# Patient Record
Sex: MALE | Race: WHITE | NOT HISPANIC OR LATINO | URBAN - METROPOLITAN AREA
[De-identification: names, ages, dates, MRNs, and addresses within clinical notes are randomized per-mention and may not be internally consistent; named-entity substitution may affect disease eponyms.]

---

## 2020-06-09 ENCOUNTER — TELEPHONE ENCOUNTER (OUTPATIENT)
Dept: URBAN - METROPOLITAN AREA CLINIC 98 | Facility: CLINIC | Age: 75
End: 2020-06-09

## 2020-06-10 ENCOUNTER — TELEPHONE ENCOUNTER (OUTPATIENT)
Dept: URBAN - METROPOLITAN AREA CLINIC 92 | Facility: CLINIC | Age: 75
End: 2020-06-10

## 2020-06-17 ENCOUNTER — TELEPHONE ENCOUNTER (OUTPATIENT)
Dept: URBAN - METROPOLITAN AREA CLINIC 98 | Facility: CLINIC | Age: 75
End: 2020-06-17

## 2020-06-26 ENCOUNTER — OFFICE VISIT (OUTPATIENT)
Dept: URBAN - METROPOLITAN AREA SURGERY CENTER 18 | Facility: SURGERY CENTER | Age: 75
End: 2020-06-26

## 2020-06-30 ENCOUNTER — OFFICE VISIT (OUTPATIENT)
Dept: URBAN - METROPOLITAN AREA CLINIC 98 | Facility: CLINIC | Age: 75
End: 2020-06-30

## 2020-07-23 ENCOUNTER — TELEPHONE ENCOUNTER (OUTPATIENT)
Dept: URBAN - METROPOLITAN AREA CLINIC 98 | Facility: CLINIC | Age: 75
End: 2020-07-23

## 2020-07-24 ENCOUNTER — OFFICE VISIT (OUTPATIENT)
Dept: URBAN - METROPOLITAN AREA SURGERY CENTER 18 | Facility: SURGERY CENTER | Age: 75
End: 2020-07-24

## 2020-09-04 ENCOUNTER — TELEPHONE ENCOUNTER (OUTPATIENT)
Dept: URBAN - METROPOLITAN AREA CLINIC 92 | Facility: CLINIC | Age: 75
End: 2020-09-04

## 2020-10-13 ENCOUNTER — OFFICE VISIT (OUTPATIENT)
Dept: URBAN - METROPOLITAN AREA CLINIC 98 | Facility: CLINIC | Age: 75
End: 2020-10-13

## 2020-10-13 NOTE — HPI-TODAY'S VISIT:
Small bowel resection over 20 years ago.  Frequent loose stools.  Epigastric pain.  Seen in emergency room February 2020.  Return to emergency room March 2020.  Intermittent epigastric pain.  Loose stool.  Has abdominal hernia from prior surgery.  Atrial fibrillation.  Chronic edema of the lower extremity with stasis changes.  On Coumadin.  Followed at [facility] by cardiology.  CT scan of abdomen was done 3/26/2020.  Benign liver cyst.  Status post cholecystectomy.  Periumbilical hernia.  Bilateral renal calcifications.  Possible colitis.  Possible cystitis.

## 2021-10-18 ENCOUNTER — TELEPHONE ENCOUNTER (OUTPATIENT)
Dept: URBAN - METROPOLITAN AREA CLINIC 92 | Facility: CLINIC | Age: 76
End: 2021-10-18

## 2021-10-21 LAB
ACCA: 77
ALCA: 106
AMCA: 62
ATYPICAL PANCA: NEGATIVE
C-REACTIVE PROTEIN, QUANT: <1
COMMENTS: (no result)
GASCA: 86
IRON BIND.CAP.(TIBC): 295
IRON SATURATION: 18
IRON: 54
UIBC: 241
VITAMIN B12: 231

## 2021-10-23 ENCOUNTER — TELEPHONE ENCOUNTER (OUTPATIENT)
Dept: URBAN - METROPOLITAN AREA CLINIC 92 | Facility: CLINIC | Age: 76
End: 2021-10-23

## 2021-11-10 ENCOUNTER — OFFICE VISIT (OUTPATIENT)
Dept: URBAN - METROPOLITAN AREA CLINIC 97 | Facility: CLINIC | Age: 76
End: 2021-11-10
Payer: MEDICARE

## 2021-11-10 ENCOUNTER — OFFICE VISIT (OUTPATIENT)
Dept: URBAN - METROPOLITAN AREA CLINIC 98 | Facility: CLINIC | Age: 76
End: 2021-11-10
Payer: MEDICARE

## 2021-11-10 ENCOUNTER — WEB ENCOUNTER (OUTPATIENT)
Dept: URBAN - METROPOLITAN AREA CLINIC 98 | Facility: CLINIC | Age: 76
End: 2021-11-10

## 2021-11-10 DIAGNOSIS — E53.8 VITAMIN B 12 DEFICIENCY: ICD-10-CM

## 2021-11-10 DIAGNOSIS — E53.8 VITAMIN B12 DEFICIENCY: ICD-10-CM

## 2021-11-10 DIAGNOSIS — K52.9 CHRONIC DIARRHEA: ICD-10-CM

## 2021-11-10 PROCEDURE — 99214 OFFICE O/P EST MOD 30 MIN: CPT | Performed by: INTERNAL MEDICINE

## 2021-11-10 PROCEDURE — 96372 THER/PROPH/DIAG INJ SC/IM: CPT | Performed by: INTERNAL MEDICINE

## 2021-11-10 RX ORDER — CHOLESTYRAMINE 4 G/5G
1 SCOOP POWDER, FOR SUSPENSION ORAL ONCE A DAY
Qty: 30 | Refills: 3 | OUTPATIENT
Start: 2021-11-21

## 2021-11-10 NOTE — HPI-TODAY'S VISIT:
77 yo pt w/ intermittent diarrhea here for follow up. Has been having intermittent loose to semi-formed bm's 3 to 4  x d, w/o nocturnal diarrhea. No abdominal pain. No fever or chills. No melenic stools nor hematochezia. Colonoscopy 6/20: w/ colon polyps. He was admitted recently to [facility]: A + P CT scan showed ileitis cw CD. Labs w/ Vitamin B 12 deficiency. To start B12 replacement. [facility]. Today, he feels well, x for persistent loose stools w/o bleeding. No anorexia or weight loss. No constitutional sxs.  EGD 6/20: NO-Schatzki's ring and mild gastritis.  He had segmental SB resection 30 yrs ago, and since he has been having loose stools. He's s/p bladder surgery on 9/21.  No COVID-19 exposure and has received 2 doses of COVID-19 vaccine.

## 2021-11-17 ENCOUNTER — OFFICE VISIT (OUTPATIENT)
Dept: URBAN - METROPOLITAN AREA CLINIC 97 | Facility: CLINIC | Age: 76
End: 2021-11-17
Payer: MEDICARE

## 2021-11-17 DIAGNOSIS — E53.8 B12 DEFICIENCY: ICD-10-CM

## 2021-11-17 PROCEDURE — 96372 THER/PROPH/DIAG INJ SC/IM: CPT | Performed by: INTERNAL MEDICINE

## 2021-11-24 ENCOUNTER — CLAIMS CREATED FROM THE CLAIM WINDOW (OUTPATIENT)
Dept: URBAN - METROPOLITAN AREA CLINIC 97 | Facility: CLINIC | Age: 76
End: 2021-11-24
Payer: MEDICARE

## 2021-11-24 ENCOUNTER — OFFICE VISIT (OUTPATIENT)
Dept: URBAN - METROPOLITAN AREA CLINIC 97 | Facility: CLINIC | Age: 76
End: 2021-11-24

## 2021-11-24 ENCOUNTER — CLAIMS CREATED FROM THE CLAIM WINDOW (OUTPATIENT)
Dept: URBAN - METROPOLITAN AREA CLINIC 97 | Facility: CLINIC | Age: 76
End: 2021-11-24

## 2021-11-24 DIAGNOSIS — D51.1 VITAMIN B12 DEFICIENCY ANEMIA DUE TO MALABSORPTION WITH PROTEINURIA: ICD-10-CM

## 2021-11-24 DIAGNOSIS — E53.8 VITAMIN B12 DEFICIENCY: ICD-10-CM

## 2021-11-24 DIAGNOSIS — D51.9 VITAMIN B12 DEFICIENCY ANEMIA, UNSPECIFIED: ICD-10-CM

## 2021-11-24 DIAGNOSIS — D51.0 VITAMIN B12 DEFICIENCY ANEMIA DUE TO INTRINSIC FACTOR DEFICIENCY: ICD-10-CM

## 2021-11-24 PROCEDURE — 96372 THER/PROPH/DIAG INJ SC/IM: CPT | Performed by: INTERNAL MEDICINE

## 2021-12-01 ENCOUNTER — OFFICE VISIT (OUTPATIENT)
Dept: URBAN - METROPOLITAN AREA CLINIC 97 | Facility: CLINIC | Age: 76
End: 2021-12-01
Payer: MEDICARE

## 2021-12-01 DIAGNOSIS — E53.8 VITAMIN B12 DEFICIENCY: ICD-10-CM

## 2021-12-01 PROBLEM — 191142007 VITAMIN B12 DEFICIENCY ANEMIA DUE TO MALABSORPTION WITH PROTEINURIA: Status: ACTIVE | Noted: 2021-11-10

## 2021-12-01 PROBLEM — 84027009 PERNICIOUS ANEMIA: Status: ACTIVE | Noted: 2021-11-10

## 2021-12-01 PROCEDURE — 96372 THER/PROPH/DIAG INJ SC/IM: CPT | Performed by: INTERNAL MEDICINE

## 2022-10-03 ENCOUNTER — TELEPHONE ENCOUNTER (OUTPATIENT)
Dept: URBAN - METROPOLITAN AREA CLINIC 98 | Facility: CLINIC | Age: 77
End: 2022-10-03

## 2022-10-12 ENCOUNTER — OFFICE VISIT (OUTPATIENT)
Dept: URBAN - METROPOLITAN AREA CLINIC 98 | Facility: CLINIC | Age: 77
End: 2022-10-12
Payer: MEDICARE

## 2022-10-12 VITALS
DIASTOLIC BLOOD PRESSURE: 73 MMHG | HEART RATE: 60 BPM | TEMPERATURE: 98.4 F | BODY MASS INDEX: 35.82 KG/M2 | WEIGHT: 215 LBS | HEIGHT: 65 IN | SYSTOLIC BLOOD PRESSURE: 148 MMHG

## 2022-10-12 DIAGNOSIS — I25.10 CORONARY ARTERY DISEASE INVOLVING NATIVE CORONARY ARTERY OF NATIVE HEART WITHOUT ANGINA PECTORIS: ICD-10-CM

## 2022-10-12 DIAGNOSIS — E66.9 OBESITY (BMI 30.0-34.9): ICD-10-CM

## 2022-10-12 DIAGNOSIS — R19.8 CHANGE IN BOWEL MOVEMENT: ICD-10-CM

## 2022-10-12 DIAGNOSIS — E88.81 METABOLIC SYNDROME: ICD-10-CM

## 2022-10-12 DIAGNOSIS — K21.9 GERD WITHOUT ESOPHAGITIS: ICD-10-CM

## 2022-10-12 PROCEDURE — 99214 OFFICE O/P EST MOD 30 MIN: CPT | Performed by: INTERNAL MEDICINE

## 2022-10-12 NOTE — HPI-TODAY'S VISIT:
78 yo pt w/ intermittent diarrhea here for follow up. He's on a wheelchair and accompanied by his grandson. He reports intermittent p-prandial loose, non-bloody stools alternating w constipation and intermittent lower abdominal discomfort, pain that is relieved w bm's. On 6/21, had bladder surgery. Six mos later had an MI and admitted to [facility]; while in the Hospital he had an unprepped colonoscopy for anemia; after D/C from the Hospital he was told he has CD and asked to have GI follow up. He has no diarrhea or bleeding. Denies fever, chills and no anorexia or weight loss. No copy of colonoscopy report nor path is available as yet.  Colonoscopy 6/20: fair prep, rectal mucosal prolapse polyp and negative random colonic bx's. He has a previous admission to [facility]: A + P CT scan showed ileitis cw CD. Labs w/ Vitamin B 12 deficiency on B 12 supplements. . He admits beeing seen by [facility], who felt no evidence of CD. Today, he feels well, x for persistent loose stools w/o bleeding. No anorexia or weight loss. No constitutional sxs.  EGD 6/20: NO-Schatzki's ring, NERD,  mild Hp-negative chronic gastritis and normal duodenal bx's.  He had segmental SB resection 30 yrs ago for a benign SB lesion. He carries a Dx of ? UC in 2013 and was on Remicade / Mesalamine then for some time, and since he has been having loose stools. Unable to find colonoscopy report from then. He's s/p bladder surgery on 9/21 as mentioned.   No COVID-19 exposure and has received 2 doses of COVID-19 vaccine.

## 2022-10-18 PROBLEM — 443371000124107: Status: ACTIVE | Noted: 2022-10-18

## 2022-10-18 PROBLEM — 237602007: Status: ACTIVE | Noted: 2022-10-18

## 2022-10-18 PROBLEM — 266435005: Status: ACTIVE | Noted: 2022-10-18

## 2022-10-18 PROBLEM — 443502000: Status: ACTIVE | Noted: 2022-10-18

## 2022-10-27 ENCOUNTER — TELEPHONE ENCOUNTER (OUTPATIENT)
Dept: URBAN - METROPOLITAN AREA CLINIC 98 | Facility: CLINIC | Age: 77
End: 2022-10-27

## 2023-03-14 ENCOUNTER — TELEPHONE ENCOUNTER (OUTPATIENT)
Dept: URBAN - METROPOLITAN AREA CLINIC 98 | Facility: CLINIC | Age: 78
End: 2023-03-14

## 2023-04-07 ENCOUNTER — OFFICE VISIT (OUTPATIENT)
Dept: URBAN - METROPOLITAN AREA TELEHEALTH 2 | Facility: TELEHEALTH | Age: 78
End: 2023-04-07
Payer: MEDICARE

## 2023-04-07 ENCOUNTER — DASHBOARD ENCOUNTERS (OUTPATIENT)
Age: 78
End: 2023-04-07

## 2023-04-07 DIAGNOSIS — R19.8 CHANGE IN BOWEL MOVEMENT: ICD-10-CM

## 2023-04-07 DIAGNOSIS — K21.9 GERD WITHOUT ESOPHAGITIS: ICD-10-CM

## 2023-04-07 DIAGNOSIS — E88.81 METABOLIC SYNDROME: ICD-10-CM

## 2023-04-07 DIAGNOSIS — E66.9 OBESITY (BMI 30.0-34.9): ICD-10-CM

## 2023-04-07 PROCEDURE — 99214 OFFICE O/P EST MOD 30 MIN: CPT | Performed by: INTERNAL MEDICINE

## 2023-04-07 NOTE — HPI-TODAY'S VISIT:
This is a telehealth OV to which patient has agreed to. Limitations of telehealth discussed, pt understands and agrees to proceed. Patient's @ home during this virtual OV.  78 yo pt w/ intermittent diarrhea here for follow up. Today, he reports constipation w straining at stools: bm's 2 to 3 x/d w occasional blood in stools, seen in tolet paper, whenever he has a bm. He had  a segmental SB resection several yrs ago for a benign lesion. Since he has been having loose bm's few times a day wo bleeding nor abdominal pain. Currently on Miralax as needed.  He's wheelchair bound. He reports intermittent p-prandial loose, non-bloody stools alternating w constipation and intermittent lower abdominal discomfort, pain that is relieved w bm's. On 6/21, had bladder surgery. Six mos later had an MI and admitted to [facility]; while in the Hospital he had an unprepped colonoscopy for anemia; after D/C from the Hospital he was told he has CD and asked to have GI follow up. He has no diarrhea or bleeding. Denies fever, chills and no anorexia or weight loss. No copy of colonoscopy report nor path is available as yet.  Colonoscopy 6/20: fair prep, rectal mucosal prolapse polyp and negative random colonic bx's. He has a previous admission to [facility]: A + P CT scan showed ileitis cw CD. Labs w/ Vitamin B 12 deficiency on B 12 supplements. . [facility] felt he has no evidence of CD. Today, he feels well overall. No anorexia or weight loss. No constitutional sxs.  EGD 6/20: NO-Schatzki's ring, NERD,  mild Hp-negative chronic gastritis and normal duodenal bx's.  He had segmental SB resection 30 yrs ago for a benign SB lesion. He carries a Dx of ? UC in 2013 and was on Remicade / Mesalamine then for some time, and since he has been having loose stools. Unable to find colonoscopy report from then. He's s/p bladder surgery on 9/21 as mentioned.   No COVID-19 exposure and has received 2 doses of COVID-19 vaccine.

## 2025-08-13 ENCOUNTER — LAB OUTSIDE AN ENCOUNTER (OUTPATIENT)
Dept: URBAN - METROPOLITAN AREA CLINIC 98 | Facility: CLINIC | Age: 80
End: 2025-08-13

## 2025-08-13 ENCOUNTER — OFFICE VISIT (OUTPATIENT)
Dept: URBAN - METROPOLITAN AREA CLINIC 98 | Facility: CLINIC | Age: 80
End: 2025-08-13
Payer: MEDICARE

## 2025-08-13 DIAGNOSIS — K52.9 IBD (INFLAMMATORY BOWEL DISEASE): ICD-10-CM

## 2025-08-13 PROCEDURE — 99204 OFFICE O/P NEW MOD 45 MIN: CPT | Performed by: INTERNAL MEDICINE

## 2025-08-13 PROCEDURE — 99214 OFFICE O/P EST MOD 30 MIN: CPT | Performed by: INTERNAL MEDICINE

## 2025-08-13 RX ORDER — SULFASALAZINE 500 MG/1
1 TABLET TABLET ORAL TWICE A DAY
Qty: 60 TABLET | Refills: 3 | OUTPATIENT
Start: 2025-08-13 | End: 2025-12-11

## 2025-08-18 ENCOUNTER — LAB OUTSIDE AN ENCOUNTER (OUTPATIENT)
Dept: URBAN - METROPOLITAN AREA CLINIC 98 | Facility: CLINIC | Age: 80
End: 2025-08-18

## 2025-08-19 ENCOUNTER — TELEPHONE ENCOUNTER (OUTPATIENT)
Dept: URBAN - METROPOLITAN AREA CLINIC 98 | Facility: CLINIC | Age: 80
End: 2025-08-19

## 2025-08-25 LAB
ADENOVIRUS F 40/41: NOT DETECTED
ANTI-SACCHAROMYCES CEREVISIAE ANTIBODIES (ASCA) - QDX: POSITIVE
CALPROTECTIN, STOOL - QDX: (no result)
CAMPYLOBACTER: NOT DETECTED
CLOSTRIDIUM DIFFICILE: NOT DETECTED
ENTAMOEBA HISTOLYTICA: NOT DETECTED
ENTEROAGGREGATIVE E.COLI: NOT DETECTED
ENTEROTOXIGENIC E.COLI: NOT DETECTED
ESCHERICHIA COLI O157: NOT DETECTED
GIARDIA LAMBLIA: NOT DETECTED
NOROVIRUS GI/GII: NOT DETECTED
OVA AND PARASITE - QDX: (no result)
ROTAVIRUS A: NOT DETECTED
SALMONELLA SPP.: NOT DETECTED
SHIGA-LIKE TOXIN PRODUCING E.COLI: NOT DETECTED
SHIGELLA SPP. / ENTEROINVASIVE E.COLI: NOT DETECTED
VIBRIO PARAHAEMOLYTICUS: NOT DETECTED
VIBRIO SPP.: NOT DETECTED
YERSINIA ENTEROCOLITICA: NOT DETECTED

## 2025-08-28 ENCOUNTER — OFFICE VISIT (OUTPATIENT)
Dept: URBAN - METROPOLITAN AREA CLINIC 98 | Facility: CLINIC | Age: 80
End: 2025-08-28
Payer: MEDICARE

## 2025-08-28 DIAGNOSIS — K50.90 GASTROINTESTINAL CROHN'S DISEASE: ICD-10-CM

## 2025-08-28 PROCEDURE — 99214 OFFICE O/P EST MOD 30 MIN: CPT | Performed by: INTERNAL MEDICINE

## 2025-08-28 RX ORDER — SULFASALAZINE 500 MG/1
1 TABLET TABLET ORAL TWICE A DAY
Qty: 60 TABLET | Refills: 3 | Status: ACTIVE | COMMUNITY
Start: 2025-08-13 | End: 2025-12-11

## 2025-08-28 RX ORDER — MESALAMINE 1.2 G/1
1 TABLET, DELAYED RELEASE ORAL TWICE A DAY
Qty: 60 | Refills: 3 | OUTPATIENT
Start: 2025-08-28